# Patient Record
Sex: MALE | Race: BLACK OR AFRICAN AMERICAN | Employment: FULL TIME | URBAN - METROPOLITAN AREA
[De-identification: names, ages, dates, MRNs, and addresses within clinical notes are randomized per-mention and may not be internally consistent; named-entity substitution may affect disease eponyms.]

---

## 2018-12-18 ENCOUNTER — HOSPITAL ENCOUNTER (EMERGENCY)
Age: 53
Discharge: HOME | End: 2018-12-18
Attending: EMERGENCY MEDICINE
Payer: OTHER MISCELLANEOUS

## 2018-12-18 VITALS
TEMPERATURE: 97.8 F | SYSTOLIC BLOOD PRESSURE: 166 MMHG | OXYGEN SATURATION: 99 % | HEIGHT: 66 IN | HEART RATE: 81 BPM | WEIGHT: 165 LBS | DIASTOLIC BLOOD PRESSURE: 97 MMHG | RESPIRATION RATE: 14 BRPM | BODY MASS INDEX: 26.52 KG/M2

## 2018-12-18 DIAGNOSIS — S39.012A LOW BACK STRAIN, INITIAL ENCOUNTER: Primary | ICD-10-CM

## 2018-12-18 PROCEDURE — 74011250637 HC RX REV CODE- 250/637: Performed by: EMERGENCY MEDICINE

## 2018-12-18 PROCEDURE — 99283 EMERGENCY DEPT VISIT LOW MDM: CPT

## 2018-12-18 RX ORDER — BISMUTH SUBSALICYLATE 262 MG
1 TABLET,CHEWABLE ORAL DAILY
COMMUNITY

## 2018-12-18 RX ORDER — HYDROCODONE BITARTRATE AND ACETAMINOPHEN 5; 325 MG/1; MG/1
TABLET ORAL
Qty: 6 TAB | Refills: 0 | Status: SHIPPED | OUTPATIENT
Start: 2018-12-18

## 2018-12-18 RX ORDER — LIDOCAINE 4 G/100G
1 PATCH TOPICAL EVERY 24 HOURS
Status: DISCONTINUED | OUTPATIENT
Start: 2018-12-18 | End: 2018-12-18

## 2018-12-18 RX ORDER — LIDOCAINE 4 G/100G
1 PATCH TOPICAL
Status: DISCONTINUED | OUTPATIENT
Start: 2018-12-18 | End: 2018-12-18 | Stop reason: HOSPADM

## 2018-12-18 RX ORDER — OXYCODONE AND ACETAMINOPHEN 5; 325 MG/1; MG/1
1 TABLET ORAL
Status: COMPLETED | OUTPATIENT
Start: 2018-12-18 | End: 2018-12-18

## 2018-12-18 RX ORDER — IBUPROFEN 800 MG/1
800 TABLET ORAL EVERY 8 HOURS
Qty: 15 TAB | Refills: 0 | Status: SHIPPED | OUTPATIENT
Start: 2018-12-18 | End: 2018-12-23

## 2018-12-18 RX ORDER — LIDOCAINE 50 MG/G
PATCH TOPICAL
Qty: 30 EACH | Refills: 0 | Status: SHIPPED | OUTPATIENT
Start: 2018-12-18

## 2018-12-18 RX ORDER — IBUPROFEN 400 MG/1
800 TABLET ORAL
Status: COMPLETED | OUTPATIENT
Start: 2018-12-18 | End: 2018-12-18

## 2018-12-18 RX ORDER — METAXALONE 800 MG/1
800 TABLET ORAL 4 TIMES DAILY
Qty: 10 TAB | Refills: 0 | Status: SHIPPED | OUTPATIENT
Start: 2018-12-18 | End: 2018-12-21

## 2018-12-18 RX ADMIN — OXYCODONE HYDROCHLORIDE AND ACETAMINOPHEN 1 TABLET: 5; 325 TABLET ORAL at 16:26

## 2018-12-18 RX ADMIN — IBUPROFEN 800 MG: 400 TABLET, FILM COATED ORAL at 16:26

## 2018-12-18 NOTE — ED PROVIDER NOTES
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 Denies rectal pain, saddle anesthesia, bowel incontinence, fever, trauma, rash, IVD use, unintentional weight loss in the past six months, sciatica. No past medical history on file. No past surgical history on file. No family history on file. Social History     Socioeconomic History    Marital status:      Spouse name: Not on file    Number of children: Not on file    Years of education: Not on file    Highest education level: Not on file   Social Needs    Financial resource strain: Not on file    Food insecurity - worry: Not on file    Food insecurity - inability: Not on file    Transportation needs - medical: Not on file   [facility] needs - non-medical: Not on file   Occupational History    Not on file   Tobacco Use    Smoking status: Never Smoker    Smokeless tobacco: Never Used   Substance and Sexual Activity    Alcohol use: Yes     Frequency: Never     Comment:  occassionally    Drug use: No    Sexual activity: Not on file   Other Topics Concern    Not on file   Social History Narrative    Not on file         ALLERGIES: Chloroquine    Review of Systems   Constitutional: Negative for fever. Musculoskeletal: Positive for back pain. Skin: Negative for rash. Neurological: Negative for weakness and numbness. All other systems reviewed and are negative. Vitals:    12/18/18 1535   BP: (!) 166/97   Pulse: 81   Resp: 14   Temp: 97.8 °F (36.6 °C)   SpO2: 99%   Weight: 74.8 kg (165 lb)   Height: 5' 6\" (1.676 m)            Physical Exam   Constitutional: Vital signs are normal. He appears well-developed and well-nourished. He is active. Non-toxic appearance. He does not appear ill. No distress. HENT:   Head: Normocephalic and atraumatic. Neck: Normal range of motion. Neck supple. Carotid bruit is not present.  No tracheal deviation present. No thyromegaly present. Cardiovascular: Normal rate, regular rhythm and normal heart sounds. Exam reveals no gallop and no friction rub. No murmur heard. Pulmonary/Chest: Effort normal and breath sounds normal. No stridor. No respiratory distress. He has no wheezes. He has no rales. He exhibits no tenderness. Abdominal: Soft. He exhibits no distension and no mass. There is no tenderness. There is no rebound, no guarding and no CVA tenderness. Musculoskeletal: Normal range of motion. He exhibits tenderness. Pain with standing and movement. Non-tender with palpation. BLE strength 5/5. Neurological: He is alert. Skin: Skin is warm, dry and intact. He is not diaphoretic. No pallor. Psychiatric: He has a normal mood and affect. His speech is normal and behavior is normal. Judgment and thought content normal.   Nursing note and vitals reviewed. MDM  Number of Diagnoses or Management Options  Low back strain, initial encounter:   Diagnosis management comments: Treat strain and d/c home. Procedures      Diagnosis:   1. Low back strain, initial encounter          Disposition: home    Follow-up Information     Follow up With Specialties Details Why [address]  Schedule an appointment as soon as possible for a visit in 1 day  [phone]    [facility] EMERGENCY DEPT Emergency Medicine  If symptoms worsen return immediately [address]  [phone]             Medication List      START taking these medications    HYDROcodone-acetaminophen 5-325 mg per tablet  Commonly known as:  NORCO  Take 1-2 tablets PO every 4-6 hours as needed for pain control. If over the counter ibuprofen or acetaminophen was suggested, then only take the vicodin for pain not well controlled with the over the counter medication.      ibuprofen 800 mg tablet  Commonly known as:  MOTRIN  Take 1 Tab by mouth every eight (8) hours for 5 days. lidocaine 5 %  Commonly known as:  LIDODERM  Apply patch to the affected area for 12 hours a day and remove for 12 hours a day. metaxalone 800 mg tablet  Commonly known as:  SKELAXIN  Take 1 Tab by mouth four (4) times daily for 3 days.         ASK your doctor about these medications    multivitamin tablet  Commonly known as:  ONE A DAY           Where to Get Your Medications      Information about where to get these medications is not yet available    Ask your nurse or doctor about these medications  · HYDROcodone-acetaminophen 5-325 mg per tablet  · ibuprofen 800 mg tablet  · lidocaine 5 %  · metaxalone 800 mg tablet

## 2018-12-18 NOTE — ED TRIAGE NOTES
The patient presents for evaluation of right lower back pain. States, \"I was moving a renay yesterday and hurt my back. It hurts worse today. \"

## 2018-12-18 NOTE — DISCHARGE INSTRUCTIONS
Back Strain: Care Instructions  Overview    A back strain happens when you overstretch, or pull, a muscle in your back. You may hurt your back in an accident or when you exercise or lift something. Sometimes you may not know how you hurt your back. Most back pain will get better with rest and time. You can take care of yourself at home to help your back heal.  Follow-up care is a key part of your treatment and safety. Be sure to make and go to all appointments, and call your doctor if you are having problems. It's also a good idea to know your test results and keep a list of the medicines you take. How can you care for yourself at home? · Try to stay as active as you can, but stop or reduce any activity that causes pain. · Put ice or a cold pack on the sore muscle for 10 to 20 minutes at a time to stop swelling. Try this every 1 to 2 hours for 3 days (when you are awake) or until the swelling goes down. Put a thin cloth between the ice pack and your skin. · After 2 or 3 days, apply a heating pad on low or a warm cloth to your back. Some doctors suggest that you go back and forth between hot and cold treatments. · Take pain medicines exactly as directed. ? If the doctor gave you a prescription medicine for pain, take it as prescribed. ? If you are not taking a prescription pain medicine, ask your doctor if you can take an over-the-counter medicine. · Try sleeping on your side with a pillow between your legs. Or put a pillow under your knees when you lie on your back. These measures can ease pain in your lower back. · Return to your usual level of activity slowly. When should you call for help? Call 911 anytime you think you may need emergency care. For example, call if:    · You are unable to move a leg at all.   [facility] your doctor now or seek immediate medical care if:    · You have new or worse symptoms in your legs, belly, or buttocks.  Symptoms may include:  ? Numbness or tingling. ? Weakness. ? Pain.     · You lose bladder or bowel control.    Watch closely for changes in your health, and be sure to contact your doctor if:    · You have a fever, lose weight, or don't feel well.     · You are not getting better as expected. Where can you learn more? Go to http://sami-tim.info/. Enter A809 in the search box to learn more about \"Back Strain: Care Instructions. \"  Current as of: November 29, 2017  Content Version: 11.8  © 7886-7564 [employer]. Care instructions adapted under license by [employer] (which disclaims liability or warranty for this information). If you have questions about a medical condition or this instruction, always ask your healthcare professional. [address] any warranty or liability for your use of this information.